# Patient Record
Sex: FEMALE | Race: WHITE | NOT HISPANIC OR LATINO | Employment: FULL TIME | URBAN - METROPOLITAN AREA
[De-identification: names, ages, dates, MRNs, and addresses within clinical notes are randomized per-mention and may not be internally consistent; named-entity substitution may affect disease eponyms.]

---

## 2023-08-15 ENCOUNTER — POSTPARTUM VISIT (OUTPATIENT)
Dept: OBGYN CLINIC | Facility: CLINIC | Age: 28
End: 2023-08-15

## 2023-08-15 VITALS
DIASTOLIC BLOOD PRESSURE: 70 MMHG | HEIGHT: 62 IN | BODY MASS INDEX: 33.13 KG/M2 | SYSTOLIC BLOOD PRESSURE: 102 MMHG | WEIGHT: 180 LBS

## 2023-08-15 PROCEDURE — 99024 POSTOP FOLLOW-UP VISIT: CPT | Performed by: OBSTETRICS & GYNECOLOGY

## 2023-08-15 NOTE — PROGRESS NOTES
[address] [place] [address], [address], [facility], [address] [facility],    Assessment/Plan:  Lissett Hale is a 33y.o. year old  who presents for postpartum visit. Routine Postpartum Care  1. Normal postpartum exam  2. Contraception: condoms  3. Depression Screen: Low risk (0)  4. Feeding: breast with supplementation  5. Psychosocial support: good  6. Patient Education: "[employer] [employer]: [email or web address]"  7. Cervical cancer screening Up to date  8. Follow up in: 3 months or as needed. Additional Problems:  1. Routine postpartum follow-up          Subjective:     CC: Postpartum visit    Alexis Russo is a 33 y.o. y.o. female  who presents for a postpartum visit. She is 8 weeks postpartum following a low cervical transverse  section on 23 at 39.1 weeks. Outcome: primary  section, low transverse incision. Anesthesia: epidural. Postpartum course has been unremarkable. Baby's course has been unremarkable. Baby is feeding by breast.     Bleeding staining only. Bowel function is normal. Bladder function is normal. Patient is not sexually active. Contraception method is condoms. Postpartum depression screening: negative. The following portions of the patient's history were reviewed and updated as appropriate: allergies, current medications, past family history, past medical history, obstetric history, gynecologic history, past social history, past surgical history and problem list.      Objective:  /70   Ht 5' 2.25" (1.581 m)   Wt 81.6 kg (180 lb)   Breastfeeding Yes Comment: pumping  BMI 32.66 kg/m²   Pregravid Weight/BMI: No episode found (BMI Could not be calculated)  Current Weight: 81.6 kg (180 lb)   Total Weight Gain: Not found.    Pre-[place] Vitals    Flowsheet Row Most Recent Value   Prenatal Assessment    Prenatal Vitals    Blood Pressure 102/70   Weight - Scale 81.6 kg (180 lb)   Urine Albumin/Glucose    Dilation/Effacement/Station Vaginal Drainage    Edema            General: Well appearing, no distress. Mood and affect: Appropriate. Abdomen: Soft, nontender  Thyroid: No masses  Incision: well healed  Vulva: Well healed  Vagina: Well healed. No lesions  Urethra: Normal  Cervix: Healed, no lesions  Uterus: Normal size, no masses  Adnexa: No pain or masses  Extremities: Warm and well perfused. Non tender.